# Patient Record
Sex: MALE | Race: WHITE | NOT HISPANIC OR LATINO | Employment: OTHER | URBAN - METROPOLITAN AREA
[De-identification: names, ages, dates, MRNs, and addresses within clinical notes are randomized per-mention and may not be internally consistent; named-entity substitution may affect disease eponyms.]

---

## 2023-10-31 PROCEDURE — 77263 THER RADIOLOGY TX PLNG CPLX: CPT | Performed by: RADIOLOGY

## 2023-10-31 PROCEDURE — 77470 SPECIAL RADIATION TREATMENT: CPT | Performed by: RADIOLOGY

## 2023-11-02 PROCEDURE — 77301 RADIOTHERAPY DOSE PLAN IMRT: CPT | Performed by: RADIOLOGY

## 2023-11-02 PROCEDURE — 77338 DESIGN MLC DEVICE FOR IMRT: CPT | Performed by: RADIOLOGY

## 2023-11-02 PROCEDURE — 77300 RADIATION THERAPY DOSE PLAN: CPT | Performed by: RADIOLOGY

## 2023-11-19 PROCEDURE — 77014 CHG CT GUIDANCE RADIATION THERAPY FLDS PLACEMENT: CPT | Performed by: RADIOLOGY

## 2023-11-20 PROCEDURE — 77014 CHG CT GUIDANCE RADIATION THERAPY FLDS PLACEMENT: CPT | Performed by: RADIOLOGY

## 2023-11-21 PROCEDURE — 77014 CHG CT GUIDANCE RADIATION THERAPY FLDS PLACEMENT: CPT | Performed by: RADIOLOGY

## 2023-11-22 PROCEDURE — 77427 RADIATION TX MANAGEMENT X5: CPT | Performed by: RADIOLOGY

## 2023-11-22 PROCEDURE — 77014 CHG CT GUIDANCE RADIATION THERAPY FLDS PLACEMENT: CPT | Performed by: RADIOLOGY

## 2023-11-27 PROCEDURE — 77014 CHG CT GUIDANCE RADIATION THERAPY FLDS PLACEMENT: CPT | Performed by: RADIOLOGY

## 2023-11-28 PROCEDURE — 77014 CHG CT GUIDANCE RADIATION THERAPY FLDS PLACEMENT: CPT | Performed by: RADIOLOGY

## 2023-11-29 PROCEDURE — 77427 RADIATION TX MANAGEMENT X5: CPT | Performed by: RADIOLOGY

## 2023-11-29 PROCEDURE — 77014 CHG CT GUIDANCE RADIATION THERAPY FLDS PLACEMENT: CPT | Performed by: RADIOLOGY

## 2023-11-30 PROCEDURE — 77014 CHG CT GUIDANCE RADIATION THERAPY FLDS PLACEMENT: CPT | Performed by: RADIOLOGY

## 2023-12-01 PROCEDURE — 77014 CHG CT GUIDANCE RADIATION THERAPY FLDS PLACEMENT: CPT | Performed by: RADIOLOGY

## 2023-12-04 PROCEDURE — 77014 CHG CT GUIDANCE RADIATION THERAPY FLDS PLACEMENT: CPT | Performed by: RADIOLOGY

## 2023-12-05 PROCEDURE — 77014 CHG CT GUIDANCE RADIATION THERAPY FLDS PLACEMENT: CPT | Performed by: RADIOLOGY

## 2023-12-06 PROCEDURE — 77014 CHG CT GUIDANCE RADIATION THERAPY FLDS PLACEMENT: CPT | Performed by: RADIOLOGY

## 2023-12-07 PROCEDURE — 77014 CHG CT GUIDANCE RADIATION THERAPY FLDS PLACEMENT: CPT | Performed by: RADIOLOGY

## 2023-12-08 PROCEDURE — 77014 CHG CT GUIDANCE RADIATION THERAPY FLDS PLACEMENT: CPT | Performed by: RADIOLOGY

## 2023-12-11 PROCEDURE — 77014 CHG CT GUIDANCE RADIATION THERAPY FLDS PLACEMENT: CPT | Performed by: RADIOLOGY

## 2023-12-12 PROCEDURE — 77014 CHG CT GUIDANCE RADIATION THERAPY FLDS PLACEMENT: CPT | Performed by: RADIOLOGY

## 2023-12-13 PROCEDURE — 77427 RADIATION TX MANAGEMENT X5: CPT | Performed by: RADIOLOGY

## 2023-12-13 PROCEDURE — 77014 CHG CT GUIDANCE RADIATION THERAPY FLDS PLACEMENT: CPT | Performed by: RADIOLOGY

## 2023-12-15 PROCEDURE — 77014 CHG CT GUIDANCE RADIATION THERAPY FLDS PLACEMENT: CPT | Performed by: RADIOLOGY

## 2023-12-18 PROCEDURE — 77014 CHG CT GUIDANCE RADIATION THERAPY FLDS PLACEMENT: CPT | Performed by: RADIOLOGY

## 2023-12-19 PROCEDURE — 77014 CHG CT GUIDANCE RADIATION THERAPY FLDS PLACEMENT: CPT | Performed by: RADIOLOGY

## 2023-12-20 PROCEDURE — 77014 CHG CT GUIDANCE RADIATION THERAPY FLDS PLACEMENT: CPT | Performed by: RADIOLOGY

## 2023-12-20 PROCEDURE — 77427 RADIATION TX MANAGEMENT X5: CPT | Performed by: RADIOLOGY

## 2023-12-21 PROCEDURE — 77014 CHG CT GUIDANCE RADIATION THERAPY FLDS PLACEMENT: CPT | Performed by: RADIOLOGY

## 2023-12-22 PROCEDURE — 77014 CHG CT GUIDANCE RADIATION THERAPY FLDS PLACEMENT: CPT | Performed by: RADIOLOGY

## 2023-12-22 PROCEDURE — 77427 RADIATION TX MANAGEMENT X5: CPT | Performed by: RADIOLOGY

## 2024-03-07 ENCOUNTER — EVALUATION (OUTPATIENT)
Dept: SPEECH THERAPY | Facility: HOSPITAL | Age: 74
End: 2024-03-07
Payer: MEDICARE

## 2024-03-07 DIAGNOSIS — Z90.02 S/P LARYNGECTOMY: ICD-10-CM

## 2024-03-07 DIAGNOSIS — Z44.8 ENCOUNTER FOR FITTING AND ADJUSTMENT OF OTHER EXTERNAL PROSTHETIC DEVICES: ICD-10-CM

## 2024-03-07 DIAGNOSIS — Z85.21 H/O LARYNGEAL CANCER: Primary | ICD-10-CM

## 2024-03-07 DIAGNOSIS — R49.1 APHONIA: ICD-10-CM

## 2024-03-07 PROCEDURE — 92597 ORAL SPEECH DEVICE EVAL: CPT | Mod: GN

## 2024-03-07 PROCEDURE — L5809 HC SLP PROVOX VEGA VOICE PROSTHESIS: HCPCS

## 2024-03-07 ASSESSMENT — PAIN SCALES - GENERAL: PAINLEVEL_OUTOF10: 0 - NO PAIN

## 2024-03-07 ASSESSMENT — PAIN - FUNCTIONAL ASSESSMENT: PAIN_FUNCTIONAL_ASSESSMENT: 0-10

## 2024-03-07 NOTE — PROGRESS NOTES
Speech-Language Pathology    TEP Evaluation    Patient Name: Jamel Castellon  MRN: [id number]  Today's Date: 3/7/2024     Time Calculation  Start Time: 1015  Stop Time: 1115  Time Calculation (min): 60 min    Patient Active Problem List   Diagnosis    H/O laryngeal cancer    S/P laryngectomy    Aphonia    Encounter for fitting and adjustment of other external prosthetic devices     Pain Assessment:  Pain Assessment: 0-10  Pain Score: 0 - No pain    VISIT PURPOSE  TEP management. The patient received a primary TEP by Dr. Herman on 10/12/2023. He states he was not shown how to manage the device or use it to produce TE speech. He is not leaking around or through the .     The patient is not wearing a HME 24/7. He states his COPD makes it difficult. He is using the Home HME. Alternative HME options also reviewed including Home/Go/Energy/Protect HME options. Samples of the Energy HME provided.    HISTORY/SUBJECTIVE  The patient underwent a total laryngectomy on 10/12/2023 by Dr. Cirilo Herman for laryngeal cancer. Postop chemo/XRT was completed on 12/22/2023. The patient's voice prosthesis was last changed during his initial placement on 10/12/2023.    ASSESSMENT/OBJECTIVE    PHYSICAL EXAM  Controlled lip movements: WFL  Controlled tongue movements: WFL  Velar elevation: WFL  Manual dexterity: WFL  Visual acuity: WFL  Cognitive status: WFL  Writing skills: WFL  Dentition: WFL  Head/neck tissue integrity: WFL    Condition of TE puncture/prosthesis: + pistoning;  shaft plugged with dried secretions and valve insert covered in biofilm  Length of TE puncture as measured: 8 mm  Type of TE prosthesis placed: 8 mm, 17 Fr Provox Nguyen (lot [id number])    The voice prosthesis was placed without complication. Clinician confirmed that the voice prosthesis is properly seated in the TE puncture. Clinician confirmed that there is not any leakage around or through the voice prosthesis with sips of water. Voice is present and easily  produced with clinician occluding the stoma. Patient had some difficulty self occluding but was starting to improve with practice. Clinician fenestrated his 9/55 LaryTube. However, manually occluding the HME with the LaryTube in place led to significant cough. Patient may be a good candidate for a LaryButton. Will assess at next visit.     The patient is currently adherent with the following DME products to restore pulmonary function post total laryngectomy:  Heat Moisture Exchange (HME) to the stoma: 2/day  Fenestrated LaryTube: 2/month (one for use, one backup)  LaryTube brushes for daily and PRN cleaning  Shower guard to protect against water entering patient's airway during showering  Voice prosthesis cleaning brushes for daily and PRN cleaning  Voice prosthesis plug to prevent aspiration : 1/month    Problems noted this date include:  Poor support system    Instruction provided in good care, maintenance and use of the voice prosthesis. Patient demonstrated the ability to clean the voice prosthesis but requires monitoring. The patient produced intelligible speech with mild - moderate voice effort.     Patient's goal for further visits is increased voicing ease.    PLAN  Patient will be seen PRN and in 2-3 weeks weeks for ongoing TEP management including but not limited to assessment of TEP condition; removal of voice prosthesis, measuring TE puncture and insertion of a voice prosthesis; assessing candidacy for pulmonary rehabilitation options and providing training. Patient benefits from continued skilled intervention in the area of alaryngeal voice rehabilitation.    Frequency: recommend treatment 4 times a year and as needed  Duration: 99 years  Progress with POC, as tolerated  Discussed POC with patient  Patient/caregiver agreeable with POC

## 2024-03-07 NOTE — Clinical Note
April 4, 2024    Cirilo Herman MD  [address]  [address]  [facility] [address]    Patient: Jamel Castellon   YOB: 1950   Date of Visit: 3/7/2024       Dear No referring provider defined for this encounter.    The attached plan of care is being sent to you because your patient’s medical reimbursement requires that you certify the plan of care. Your signature is required to allow uninterrupted insurance coverage.      You may indicate your approval by signing below and faxing this form back to us at Dept Fax: [phone].    Please call Dept: [phone] with any questions or concerns.    Thank you for this referral,        FEI Guaman  [address] [address] [phone]    Payer: Payor: [employer] / Plan: [employer]ARE / Product Type: *No Product type* /                                                                         Date:     Dear FEI Guaman,     Re: Mr. Jamel Castellon, MRN:[id number]    I certify that I have reviewed the attached plan of care and it is medically necessary for Mr. Jamel Castellon (1950) who is under my care.          ______________________________________                    _________________  Provider name and credentials                                           Date and time                                                                                           Plan of Care 4/4/24   Effective from: 4/4/2024  Effective to: 7/3/2024    Plan ID: [id number]            Participants as of Finalize on 4/4/2024    Name Type Comments Contact Info    Cirilo Herman MD Consulting Physician  [phone]    FEI Guaman Speech Language Pathologist  [phone]       Last Plan Note     Author: FEI Guaman Status: Signed Last edited: 4/4/2024  1:00 PM       Speech-Language Pathology    TEP Evaluation    Patient Name: Jamel KAMARA  Ravinder  MRN: [id number]  Today's Date: 4/4/2024     Time Calculation  Start Time: 1300  Stop Time: 1345  Time Calculation (min): 45 min      Current Problem:  Patient Active Problem List   Diagnosis    H/O laryngeal cancer    S/P laryngectomy    Aphonia    Encounter for fitting and adjustment of other external prosthetic devices     Pain Assessment:  Pain Assessment: 0-10  Pain Score: 0 - No pain    VISIT PURPOSE  TEP management. No leaking around/through reported.     The patient continues to practice producing TE speech and EL speech. His intelligibility has improved with the EL.     The patient is now wearing a HME 24/7 as directed. He prefers the Energy HME by [employer].     HISTORY/SUBJECTIVE  The patient underwent a total laryngectomy on 10/12/2023 by Dr. Cirilo Herman for laryngeal cancer. Postop chemo/XRT was completed on 12/22/2023. The patient's voice prosthesis was last changed on 3/7/2024 and a 8 mm, 17 Fr Provox Nguyen was successfully placed.     ASSESSMENT/OBJECTIVE   Condition of TE puncture/prosthesis:  removed to assess TE speech for diagnostic purposes; + biofilm  Length of TE puncture as measured: 6 - 8 mm  Type of TE prosthesis placed: 8 mm, 17 Fr Provox Nguyen (lot [id number])     Diagnotic therapy revealed brief TE speech only. Patient able to produce 1 syllable and then the airflow stops. Questionable etiology including CP spasm vs some other type of obstruction. Resizing of the TEP tract remained unchanged at 8 mm. The patient would benefit from direct visualization of the neopharynx to assist with TE speech problem solving.     The voice prosthesis was placed without complication. Clinician confirmed that the voice prosthesis is properly seated in the TE puncture. Clinician confirmed that there is not any leakage around or through the voice prosthesis with sips of water.       The patient is currently adherent with the following DME products to restore pulmonary function post total  laryngectomy:  Heat Moisture Exchange (HME) to the stoma: 2/day  Fenestrated LaryTube: 2/month (one for use, one backup)  LaryTube brushes for daily and PRN cleaning  Shower guard to protect against water entering patient's airway during showering  Voice prosthesis cleaning brushes for daily and PRN cleaning  Voice prosthesis plug to prevent aspiration : 1/month     Problems noted this date include:  Poor TE speech production     Instruction provided in good care, maintenance and use of the voice prosthesis. Patient demonstrated the ability to clean the voice prosthesis but requires monitoring.     Session targeted additional training in alaryngeal speech via the electrolarynx. Patient with easy, automatic placement submentally. Min cueing required and the patient was able to produce speech with 90% intelligibly. Encouraged ongoing practice to achieve proficiency of use.      PLAN  Patient will be seen PRN and in 2 months for ongoing TEP management including but not limited to assessment of TEP condition; removal of voice prosthesis, measuring TE puncture and insertion of a voice prosthesis; assessing candidacy for pulmonary rehabilitation options and providing training. A TNE scope procedure with Dr. Pearl and clinician has been scheduled for 6/10/2024. Clinician will review with Dr. Herman and discuss the POC.    Patient benefits from continued skilled intervention in the area of alaryngeal voice rehabilitation.     Frequency: recommend treatment 4 times a year and as needed  Duration: 99 years  Progress with POC, as tolerated  Discussed POC with patient  Patient/caregiver agreeable with POC         Current Participants as of 4/4/2024    Name Type Comments Contact Jamar Herman MD Consulting Physician  [phone]    Signature pending    Janneth Perea, SLP Speech Language Pathologist  [phone]

## 2024-03-28 ENCOUNTER — PROCEDURE VISIT (OUTPATIENT)
Dept: SPEECH THERAPY | Facility: CLINIC | Age: 74
End: 2024-03-28
Payer: MEDICARE

## 2024-03-28 DIAGNOSIS — Z85.21 H/O LARYNGEAL CANCER: Primary | ICD-10-CM

## 2024-03-28 DIAGNOSIS — Z90.02 S/P LARYNGECTOMY: ICD-10-CM

## 2024-03-28 DIAGNOSIS — R49.1 APHONIA: ICD-10-CM

## 2024-03-28 PROCEDURE — 92507 TX SP LANG VOICE COMM INDIV: CPT | Mod: GN

## 2024-03-28 ASSESSMENT — PAIN SCALES - GENERAL: PAINLEVEL_OUTOF10: 0 - NO PAIN

## 2024-03-28 ASSESSMENT — PAIN - FUNCTIONAL ASSESSMENT: PAIN_FUNCTIONAL_ASSESSMENT: 0-10

## 2024-03-28 NOTE — PROGRESS NOTES
Speech-Language Pathology    [facility] Adult [facility] Speech-Language Pathology Treatment     Patient Name: Jamel Castellon  MRN: [id number]  Today's Date: 3/28/2024     Time Calculation  Start Time: 1300  Stop Time: 1355  Time Calculation (min): 55 min      Current Problem:   1. H/O laryngeal cancer        2. S/P laryngectomy        3. Aphonia          Pain Assessment:  Pain Assessment: 0-10  Pain Score: 0 - No pain    SUBJECTIVE  Patient alert and oriented and ready to participate in office visit this date.    OBJECTIVE  LONG TERM GOAL  Improve alaryngeal communication skills to foster increased participation levels at home, work and in the community environment.    SHORT TERM GOALS  Patient will demonstrate adequate stoma management and participate in pulmonary rehabilitation X 80% accuracy.  Patient will produce intelligible alaryngeal speech via electrolarynx/TEP x 80% accuracy.    ASSESSMENT  Patient using writing board and text to speech abdon as primary methods of communication. He states he did not practice TE speech with LaryTube out as directed. He has been cleaning the  as instructed without issue. No Leaking reported around or through the . Clinician observed patient  sipping water and leaking not noted.  not changed this date. Initially the patient was able to produce TE speech with clinician manually occluding his stoma. However, following several sentence TE speech not able to be produced despite cleaning the  and trying a variety of head positional changes. Patient has not been consuming solid foods since his laryngectomy. Liquids only. Patient would benefit from direct visualization of the neopharynx to assist with TE speech problem solving. Clinician will help set that up with either Dr. Pearl or Dr. Mendoza and speech.     Session targeted alaryngeal speech via the electrolarynx. Electrolarynx training conducted with use of submental placement. Reviewed compensatory speech strategies to  improve intelligibility including proper placement, increased mouth opening, natural speech rate and phrasing. Model provided throughout the session and with repeated trials improved speech intelligibly noted to > 80%. Encouraged ongoing practice to achieve proficiency of use.     PLAN  Proceed with TEP problem solving  Continue current plan of care of increased intelligibility with the electrolarynx - 1 week follow up appointment scheduled  Progress with POC, as tolerated  Discussed POC with patient  Patient/caregiver agreeable with POC

## 2024-04-04 ENCOUNTER — TREATMENT (OUTPATIENT)
Dept: SPEECH THERAPY | Facility: CLINIC | Age: 74
End: 2024-04-04
Payer: MEDICARE

## 2024-04-04 DIAGNOSIS — R49.1 APHONIA: ICD-10-CM

## 2024-04-04 DIAGNOSIS — Z90.02 S/P LARYNGECTOMY: ICD-10-CM

## 2024-04-04 DIAGNOSIS — Z44.8 ENCOUNTER FOR FITTING AND ADJUSTMENT OF OTHER EXTERNAL PROSTHETIC DEVICES: ICD-10-CM

## 2024-04-04 DIAGNOSIS — Z85.21 H/O LARYNGEAL CANCER: Primary | ICD-10-CM

## 2024-04-04 PROCEDURE — 92597 ORAL SPEECH DEVICE EVAL: CPT | Mod: GN

## 2024-04-04 PROCEDURE — L5809 HC SLP PROVOX VEGA VOICE PROSTHESIS: HCPCS

## 2024-04-04 ASSESSMENT — PAIN SCALES - GENERAL: PAINLEVEL_OUTOF10: 0 - NO PAIN

## 2024-04-04 ASSESSMENT — PAIN - FUNCTIONAL ASSESSMENT: PAIN_FUNCTIONAL_ASSESSMENT: 0-10

## 2024-04-04 NOTE — PROGRESS NOTES
Speech-Language Pathology    TEP Evaluation    Patient Name: Jamel Castellon  MRN: [id number]  Today's Date: 4/4/2024     Time Calculation  Start Time: 1300  Stop Time: 1345  Time Calculation (min): 45 min      Current Problem:  Patient Active Problem List   Diagnosis    H/O laryngeal cancer    S/P laryngectomy    Aphonia    Encounter for fitting and adjustment of other external prosthetic devices     Pain Assessment:  Pain Assessment: 0-10  Pain Score: 0 - No pain    VISIT PURPOSE  TEP management. No leaking around/through reported.     The patient continues to practice producing TE speech and EL speech. His intelligibility has improved with the EL.     The patient is now wearing a HME 24/7 as directed. He prefers the Energy HME by [employer].     HISTORY/SUBJECTIVE  The patient underwent a total laryngectomy on 10/12/2023 by Dr. Cirilo Herman for laryngeal cancer. Postop chemo/XRT was completed on 12/22/2023. The patient's voice prosthesis was last changed on 3/7/2024 and a 8 mm, 17 Fr Provox Nguyen was successfully placed.     ASSESSMENT/OBJECTIVE   Condition of TE puncture/prosthesis:  removed to assess TE speech for diagnostic purposes; + biofilm  Length of TE puncture as measured: 6 - 8 mm  Type of TE prosthesis placed: 8 mm, 17 Fr Provox Nguyen (lot [id number])     Diagnotic therapy revealed brief TE speech only. Patient able to produce 1 syllable and then the airflow stops. Questionable etiology including CP spasm vs some other type of obstruction. Resizing of the TEP tract remained unchanged at 8 mm. The patient would benefit from direct visualization of the neopharynx to assist with TE speech problem solving.     The voice prosthesis was placed without complication. Clinician confirmed that the voice prosthesis is properly seated in the TE puncture. Clinician confirmed that there is not any leakage around or through the voice prosthesis with sips of water.       The patient is currently adherent with the following  DME products to restore pulmonary function post total laryngectomy:  Heat Moisture Exchange (HME) to the stoma: 2/day  Fenestrated LaryTube: 2/month (one for use, one backup)  LaryTube brushes for daily and PRN cleaning  Shower guard to protect against water entering patient's airway during showering  Voice prosthesis cleaning brushes for daily and PRN cleaning  Voice prosthesis plug to prevent aspiration : 1/month     Problems noted this date include:  Poor TE speech production     Instruction provided in good care, maintenance and use of the voice prosthesis. Patient demonstrated the ability to clean the voice prosthesis but requires monitoring.     Session targeted additional training in alaryngeal speech via the electrolarynx. Patient with easy, automatic placement submentally. Min cueing required and the patient was able to produce speech with 90% intelligibly. Encouraged ongoing practice to achieve proficiency of use.      PLAN  Patient will be seen PRN and in 2 months for ongoing TEP management including but not limited to assessment of TEP condition; removal of voice prosthesis, measuring TE puncture and insertion of a voice prosthesis; assessing candidacy for pulmonary rehabilitation options and providing training. A TNE scope procedure with Dr. Pearl and clinician has been scheduled for 6/10/2024. Clinician will review with Dr. Herman and discuss the POC.    Patient benefits from continued skilled intervention in the area of alaryngeal voice rehabilitation.     Frequency: recommend treatment 4 times a year and as needed  Duration: 99 years  Progress with POC, as tolerated  Discussed POC with patient  Patient/caregiver agreeable with POC

## 2024-04-04 NOTE — Clinical Note
April 4, 2024    Cirilo Herman MD  [address]  [address]  [facility] [address]    Patient: Jamel Castellon   YOB: 1950   Date of Visit: 4/4/2024       Dear No referring provider defined for this encounter.    The attached plan of care is being sent to you because your patient’s medical reimbursement requires that you certify the plan of care. Your signature is required to allow uninterrupted insurance coverage.      You may indicate your approval by signing below and faxing this form back to us at Dept Fax: [phone].    Please call Dept: [phone] with any questions or concerns.    Thank you for this referral,        FEI Guaman  [address] [address]    Payer: Payor: [employer]ICARE / Plan: [employer] / Product Type: *No Product type* /                                                                         Date:     Dear FEI Guaman,     Re: Mr. Jamel Castellon, MRN:[id number]    I certify that I have reviewed the attached plan of care and it is medically necessary for Mr. Jamel Castellon (1950) who is under my care.          ______________________________________                    _________________  Provider name and credentials                                           Date and time                                                                                           Plan of Care 4/4/24   Effective from: 4/4/2024  Effective to: 7/3/2024    Plan ID: [id number]            Participants as of Finalize on 4/4/2024    Name Type Comments Contact Info    Cirilo Herman MD Consulting Physician  [phone]    Janneth Perea SLP Speech Language Pathologist  [phone]       Last Plan Note     Author: FEI Guaman Status: Signed Last edited: 4/4/2024  1:00 PM       Speech-Language Pathology    TEP Evaluation    Patient Name: Jamel Castellon  MRN: [id number]  Today's  Date: 4/4/2024     Time Calculation  Start Time: 1300  Stop Time: 1345  Time Calculation (min): 45 min      Current Problem:  Patient Active Problem List   Diagnosis    H/O laryngeal cancer    S/P laryngectomy    Aphonia    Encounter for fitting and adjustment of other external prosthetic devices     Pain Assessment:  Pain Assessment: 0-10  Pain Score: 0 - No pain    VISIT PURPOSE  TEP management. No leaking around/through reported.     The patient continues to practice producing TE speech and EL speech. His intelligibility has improved with the EL.     The patient is now wearing a HME 24/7 as directed. He prefers the Energy HME by [employer].     HISTORY/SUBJECTIVE  The patient underwent a total laryngectomy on 10/12/2023 by Dr. Cirilo Herman for laryngeal cancer. Postop chemo/XRT was completed on 12/22/2023. The patient's voice prosthesis was last changed on 3/7/2024 and a 8 mm, 17 Fr Provox Nguyen was successfully placed.     ASSESSMENT/OBJECTIVE   Condition of TE puncture/prosthesis:  removed to assess TE speech for diagnostic purposes; + biofilm  Length of TE puncture as measured: 6 - 8 mm  Type of TE prosthesis placed: 8 mm, 17 Fr Provox Nguyen (lot [id number])     Diagnotic therapy revealed brief TE speech only. Patient able to produce 1 syllable and then the airflow stops. Questionable etiology including CP spasm vs some other type of obstruction. Resizing of the TEP tract remained unchanged at 8 mm. The patient would benefit from direct visualization of the neopharynx to assist with TE speech problem solving.     The voice prosthesis was placed without complication. Clinician confirmed that the voice prosthesis is properly seated in the TE puncture. Clinician confirmed that there is not any leakage around or through the voice prosthesis with sips of water.       The patient is currently adherent with the following DME products to restore pulmonary function post total laryngectomy:  Heat Moisture Exchange (HME) to  the stoma: 2/day  Fenestrated LaryTube: 2/month (one for use, one backup)  LaryTube brushes for daily and PRN cleaning  Shower guard to protect against water entering patient's airway during showering  Voice prosthesis cleaning brushes for daily and PRN cleaning  Voice prosthesis plug to prevent aspiration : 1/month     Problems noted this date include:  Poor TE speech production     Instruction provided in good care, maintenance and use of the voice prosthesis. Patient demonstrated the ability to clean the voice prosthesis but requires monitoring.     Session targeted additional training in alaryngeal speech via the electrolarynx. Patient with easy, automatic placement submentally. Min cueing required and the patient was able to produce speech with 90% intelligibly. Encouraged ongoing practice to achieve proficiency of use.      PLAN  Patient will be seen PRN and in 2 months for ongoing TEP management including but not limited to assessment of TEP condition; removal of voice prosthesis, measuring TE puncture and insertion of a voice prosthesis; assessing candidacy for pulmonary rehabilitation options and providing training. A TNE scope procedure with Dr. Pearl and clinician has been scheduled for 6/10/2024. Clinician will review with Dr. Herman and discuss the POC.    Patient benefits from continued skilled intervention in the area of alaryngeal voice rehabilitation.     Frequency: recommend treatment 4 times a year and as needed  Duration: 99 years  Progress with POC, as tolerated  Discussed POC with patient  Patient/caregiver agreeable with POC         Current Participants as of 4/4/2024    Name Type Comments Contact Jamar Herman MD Consulting Physician  [phone]    Signature pending    Janneth Perea, SLP Speech Language Pathologist  [phone]